# Patient Record
Sex: FEMALE | Employment: FULL TIME | URBAN - METROPOLITAN AREA
[De-identification: names, ages, dates, MRNs, and addresses within clinical notes are randomized per-mention and may not be internally consistent; named-entity substitution may affect disease eponyms.]

---

## 2024-04-08 ENCOUNTER — OFFICE VISIT (OUTPATIENT)
Dept: FAMILY MEDICINE CLINIC | Facility: CLINIC | Age: 29
End: 2024-04-08
Payer: MEDICARE

## 2024-04-08 VITALS
BODY MASS INDEX: 47.65 KG/M2 | DIASTOLIC BLOOD PRESSURE: 88 MMHG | HEIGHT: 65 IN | TEMPERATURE: 97.8 F | WEIGHT: 286 LBS | OXYGEN SATURATION: 98 % | SYSTOLIC BLOOD PRESSURE: 122 MMHG | HEART RATE: 96 BPM | RESPIRATION RATE: 16 BRPM

## 2024-04-08 DIAGNOSIS — F43.23 ADJUSTMENT DISORDER WITH MIXED ANXIETY AND DEPRESSED MOOD: ICD-10-CM

## 2024-04-08 DIAGNOSIS — F32.A ANXIETY AND DEPRESSION: ICD-10-CM

## 2024-04-08 DIAGNOSIS — F41.9 ANXIETY AND DEPRESSION: ICD-10-CM

## 2024-04-08 DIAGNOSIS — R03.0 ELEVATED BP WITHOUT DIAGNOSIS OF HYPERTENSION: ICD-10-CM

## 2024-04-08 DIAGNOSIS — E66.01 OBESITY, MORBID, BMI 40.0-49.9 (HCC): ICD-10-CM

## 2024-04-08 DIAGNOSIS — E55.9 VITAMIN D DEFICIENCY: ICD-10-CM

## 2024-04-08 DIAGNOSIS — Z98.84 HISTORY OF BARIATRIC SURGERY: Primary | ICD-10-CM

## 2024-04-08 PROCEDURE — 99204 OFFICE O/P NEW MOD 45 MIN: CPT | Performed by: FAMILY MEDICINE

## 2024-04-08 RX ORDER — BUPROPION HYDROCHLORIDE 150 MG/1
150 TABLET ORAL EVERY MORNING
Qty: 30 TABLET | Refills: 1 | Status: SHIPPED | OUTPATIENT
Start: 2024-04-08 | End: 2024-10-05

## 2024-04-08 NOTE — ASSESSMENT & PLAN NOTE
Performed in October 2019 and was seeing Dr. Muir in the past   Was previously 307 lbs and lost 110 lbs as a result of surgery.   Pt unfortunately regained 85 lbs with both pregnancies combined  States she's tried diet and consumes low calories  Also worked out for 1 months and saw no results   Recommend seeing weight management and resuming exercise   Also started Wellbutrin which may help with weight   Had tried phentermine and Topamax prior to the surgery

## 2024-04-08 NOTE — ASSESSMENT & PLAN NOTE
Ambulatory Care Coordination ED COVID Follow up Call    Challenges to be reviewed by the provider   Additional needs identified to be addressed with provider no  none           Encounter was not routed to provider for escalation. Method of communication with provider : none    Discussed COVID-19 related testing which was available at this time. Test results were positive. Patient informed of results, if available? yes. Current Symptoms: cough, no new symptoms, no worsening symptoms and son states patient doing a bit better today. Reviewed New or Changed Meds: yes    Do you have what you need at home?  Durable Medical Equipment ordered at discharge: None   Home Health/Outpatient orders at discharge: none    Pulse oximeter? no     Patient education provided: Reviewed appropriate site of care based on symptoms and resources available to patient including: PCP, Urgent Care Clinics and [address] [place] [address]. Interventions: Obtained and reviewed discharge summary and/or continuity of care documents  Reviewed discharge instructions, medical action plan and red flags with family who verbalized understanding. Provided contact information for future needs. Plan for follow-up call in 3-5 days based on severity of symptoms and risk factors.   Plan for next call: follow up     Adrian Cruz RN Bp initially elevated but came down to 122/88   Anxiety and depression also contributing  Will continue to monitor

## 2024-04-08 NOTE — PROGRESS NOTES
Name: Annie Ibrahim      : 1995      MRN: [id number]  Encounter Provider: Anay Laird MD  Encounter Date: 2024   Encounter department: [facility]    Assessment & Plan     1. History of bariatric surgery  Assessment & Plan:  Performed in 2019 and was seeing Dr. Muir in the past   Was previously 307 lbs and lost 110 lbs as a result of surgery.   Pt unfortunately regained 85 lbs with both pregnancies combined  States she's tried diet and consumes low calories  Also worked out for 1 months and saw no results   Recommend seeing weight management and resuming exercise   Also started Wellbutrin which may help with weight   Had tried phentermine and Topamax prior to the surgery     Orders:  -     Ambulatory Referral to Weight Management; Future  -     CBC and differential; Future  -     Comprehensive metabolic panel; Future  -     Lipid panel; Future  -     Hemoglobin A1C; Future  -     Vitamin B12; Future  -     Vitamin D 25 hydroxy; Future  -     Folate; Future  -     Vitamin B1, whole blood; Future    2. Obesity, morbid, BMI 40.0-49.9 ([facility])  -     Ambulatory Referral to Weight Management; Future  -     Comprehensive metabolic panel; Future  -     Lipid panel; Future  -     Hemoglobin A1C; Future  -     TSH, 3rd generation; Future    3. Elevated BP without diagnosis of hypertension  Assessment & Plan:  Bp initially elevated but came down to 122/88   Anxiety and depression also contributing  Will continue to monitor      4. Anxiety and depression  Assessment & Plan:  Diagnosed 2-3 years ago after her second child and was on zoloft briefly   Was previosuly stress related and is going through a break up  Son also recently diagnosed with autism   Mom is supportive and moved in with her recently  Discussed treatment   Will start wellbutrin 150 mg daily   Also referred pt to psych    Orders:  -     buPROPion (WELLBUTRIN XL) 150 mg 24 hr tablet; Take 1 tablet (150 mg total) by  "mouth every morning  -     CBC and differential; Future  -     TSH, 3rd generation; Future  -     Vitamin B12; Future  -     Vitamin D 25 hydroxy; Future  -     Ambulatory referral to Psych Services; Future    5. Vitamin D deficiency  Assessment & Plan:  Will recheck    Orders:  -     Vitamin D 25 hydroxy; Future    6. Adjustment disorder with mixed anxiety and depressed mood  -     Ambulatory referral to Psych Services; Future           Subjective      New patient   Last PCP Dr. Barba with [facility] who is no longer part of the practice   He was last seen 2 years ago   History of of anxiety and depression  Blood pressure worsened after having flu in Feb  Never had issues in the past  The depression has gotten worse in the past 3 weeks   Son recently diagnosed with autsism and broke with the father of her kids  Were together for 10 years and has recently moved in with her mother  AVELINO beltran 3 panic attacks the this past week   Weight also affecting her mood  Interested in weight loss medications  States she tried diet and exercise without any results    Anxiety        Review of Systems    No current outpatient medications on file prior to visit.       Objective     /88 (BP Location: Left arm, Patient Position: Sitting)   Pulse 96   Temp 97.8 °F (36.6 °C) (Tympanic)   Resp 16   Ht 5' 5\" (1.651 m)   Wt 130 kg (286 lb)   SpO2 98%   BMI 47.59 kg/m²     Physical Exam  Constitutional:       General: She is not in acute distress.     Appearance: Normal appearance. She is not ill-appearing or toxic-appearing.   HENT:      Head: Normocephalic and atraumatic.      Right Ear: Tympanic membrane normal.      Left Ear: Tympanic membrane normal.      Mouth/Throat:      Mouth: Mucous membranes are moist.   Eyes:      Extraocular Movements: Extraocular movements intact.   Cardiovascular:      Rate and Rhythm: Normal rate and regular rhythm.      Heart sounds: No murmur heard.  Pulmonary:      Effort: Pulmonary effort is " normal. No respiratory distress.      Breath sounds: Normal breath sounds. No stridor. No wheezing or rales.   Abdominal:      General: There is no distension.      Palpations: Abdomen is soft. There is no mass.      Tenderness: There is no abdominal tenderness. There is no guarding or rebound.      Hernia: No hernia is present.   Musculoskeletal:      Right lower leg: No edema.      Left lower leg: No edema.   Lymphadenopathy:      Cervical: No cervical adenopathy.   Skin:     General: Skin is warm.   Neurological:      Mental Status: She is alert and oriented to person, place, and time.   Psychiatric:         Attention and Perception: Attention normal.         Mood and Affect: Mood is anxious and depressed. Affect is tearful.         Speech: Speech normal.         Behavior: Behavior is cooperative.     Anay Laird MD

## 2024-04-08 NOTE — ASSESSMENT & PLAN NOTE
Diagnosed 2-3 years ago after her second child and was on zoloft briefly   Was previosuly stress related and is going through a break up  Son also recently diagnosed with autism   Mom is supportive and moved in with her recently  Discussed treatment   Will start wellbutrin 150 mg daily   Also referred pt to psych

## 2024-04-09 ENCOUNTER — TELEPHONE (OUTPATIENT)
Dept: ADMINISTRATIVE | Facility: OTHER | Age: 29
End: 2024-04-09

## 2024-04-09 NOTE — TELEPHONE ENCOUNTER
----- Message from Anay Laird MD sent at 4/8/2024  1:37 PM EDT -----  Regarding: pap  Patient states she had a pap through [facility] in Jan 2023. Could we please review the chart. Thank you       Amy BIGGS [facility]  04/08/24 10:23 AM    Hello, our patient above has had Pap Smear (HPV) aka Cervical Cancer Screening completed/performed. Please assist in updating the patient chart by making an External outreach to [facility]  facility located in [place], [place]. The date of service is 01/2023.    Thank you,  Amy HAND

## 2024-04-09 NOTE — LETTER
Procedure Request Form: Cervical Cancer Screening      Date Requested: 24  Patient: Annie Ibrahim  Patient : 1995   Referring Provider: Anay Laird MD        Date of Procedure _________2023 report_____________________       The above patient has informed us that they have completed their   most recent Cervical Cancer Screening at your facility. Please complete   this form and attach all corresponding procedure reports/results.    Comments __________________________________________________________  ____________________________________________________________________  ____________________________________________________________________  ____________________________________________________________________    Facility Completing Procedure _________________________________________    Form Completed By (print name) _______________________________________      Signature __________________________________________________________      These reports are needed for  compliance.    Please fax this completed form and a copy of the procedure report to our office located at [address] [place] [address] as soon as possible to Fax [phone] attention Chas: Phone [phone]    We thank you for your assistance in treating our mutual patient.

## 2024-04-09 NOTE — TELEPHONE ENCOUNTER
Upon review of the In Basket request and the patient's chart, initial outreach has been made via fax to facility. Please see Contacts section for details.     Thank you  Chas Chavez MA

## 2024-04-11 NOTE — TELEPHONE ENCOUNTER
Upon review of the In Basket request we were able to locate, review, and update the patient chart as requested for Pap Smear (HPV) aka Cervical Cancer Screening.    Any additional questions or concerns should be emailed to the Practice Liaisons via the appropriate education email address, please do not reply via In Basket.    Thank you  Chas Chavez MA

## 2024-04-26 DIAGNOSIS — H10.021 PINK EYE DISEASE OF RIGHT EYE: Primary | ICD-10-CM

## 2024-06-17 ENCOUNTER — CONSULT (OUTPATIENT)
Dept: FAMILY MEDICINE CLINIC | Facility: CLINIC | Age: 29
End: 2024-06-17
Payer: MEDICARE

## 2024-06-17 VITALS
BODY MASS INDEX: 48.25 KG/M2 | RESPIRATION RATE: 16 BRPM | SYSTOLIC BLOOD PRESSURE: 126 MMHG | TEMPERATURE: 96 F | HEART RATE: 76 BPM | HEIGHT: 65 IN | DIASTOLIC BLOOD PRESSURE: 90 MMHG | OXYGEN SATURATION: 95 % | WEIGHT: 289.6 LBS

## 2024-06-17 DIAGNOSIS — F32.A ANXIETY AND DEPRESSION: ICD-10-CM

## 2024-06-17 DIAGNOSIS — E66.01 OBESITY, MORBID, BMI 40.0-49.9 (HCC): ICD-10-CM

## 2024-06-17 DIAGNOSIS — Z01.818 PREOPERATIVE CLEARANCE: Primary | ICD-10-CM

## 2024-06-17 DIAGNOSIS — E55.9 VITAMIN D DEFICIENCY: ICD-10-CM

## 2024-06-17 DIAGNOSIS — Z98.84 HISTORY OF BARIATRIC SURGERY: ICD-10-CM

## 2024-06-17 DIAGNOSIS — E53.8 VITAMIN B12 DEFICIENCY: ICD-10-CM

## 2024-06-17 DIAGNOSIS — R03.0 ELEVATED BP WITHOUT DIAGNOSIS OF HYPERTENSION: ICD-10-CM

## 2024-06-17 DIAGNOSIS — F41.9 ANXIETY AND DEPRESSION: ICD-10-CM

## 2024-06-17 DIAGNOSIS — E61.1 IRON DEFICIENCY: ICD-10-CM

## 2024-06-17 PROCEDURE — 99214 OFFICE O/P EST MOD 30 MIN: CPT | Performed by: FAMILY MEDICINE

## 2024-06-17 RX ORDER — BUPROPION HYDROCHLORIDE 300 MG/1
300 TABLET ORAL EVERY MORNING
Qty: 30 TABLET | Refills: 1 | Status: SHIPPED | OUTPATIENT
Start: 2024-06-17 | End: 2024-12-14

## 2024-06-17 NOTE — PROGRESS NOTES
"PRE-OPERATIVE EXAMINATION  Annie Tobias  1995    Annie Tobias is a 29 y.o. female with anxiety, depression, and obesity who is planning to undergo laparoscopic sleeve gastrectomy under general by Dr. Rodriguez on 7/10/24.  Patient has not had complications with anesthesia in the past.     ROS:   Chest pain: no   Shortness of breath: no  Shortness of breath with exertion: no  Orthopnea: no  Dizziness: no  Unexplained weight change: no    PMH:  CAD: no  HTN: no  CKD: no  DM: no on insulin: no  History of CVA: no     reports that she has never smoked. She has never used smokeless tobacco. She reports that she does not currently use alcohol after a past usage of about 2.0 standard drinks of alcohol per week. She reports that she does not use drugs.    /90 (BP Location: Right arm, Patient Position: Sitting, Cuff Size: Large)   Pulse 76   Temp (!) 96 °F (35.6 °C) (Tympanic)   Resp 16   Ht 5' 5\" (1.651 m)   Wt 131 kg (289 lb 9.6 oz)   SpO2 95%   BMI 48.19 kg/m²   Physical Exam  Vitals reviewed.   Constitutional:       Appearance: Normal appearance.   HENT:      Head: Normocephalic and atraumatic.      Mouth/Throat:      Mouth: Mucous membranes are moist.   Eyes:      Extraocular Movements: Extraocular movements intact.   Cardiovascular:      Rate and Rhythm: Normal rate and regular rhythm.      Heart sounds: No murmur heard.  Pulmonary:      Effort: Pulmonary effort is normal. No respiratory distress.      Breath sounds: Normal breath sounds. No wheezing or rales.   Abdominal:      General: There is no distension.      Palpations: Abdomen is soft. There is no mass.      Tenderness: There is no abdominal tenderness. There is no guarding or rebound.      Hernia: No hernia is present.   Musculoskeletal:      Right lower leg: No edema.      Left lower leg: No edema.   Lymphadenopathy:      Cervical: No cervical adenopathy.   Skin:     General: Skin is warm.   Neurological:      Mental Status: She is " alert and oriented to person, place, and time.   Psychiatric:         Behavior: Behavior normal.         Revised Cardiac Risk Index (RCRI) for Pre-Operative Risk   (estimates risk of cardiac complications after noncardiac surgery)    High-risk surgery: No 0   Intraperitoneal, intrathoracic, suprainguinal vascular  History of ischemic heart disease: No 0  History of CHF: No 0   History of cerebrovascular disease: No 0   Pre-operative treatment with insulin: No 0   Pre-operative creatinine >2 mg/dL: No 0    RCRI Scorin points: Class I Risk, 3.9% Risk of Major Cardiac Event     Functional Capacity Levels      Good (7-10 METs):   - Do light housework    - climbs a flight of stairs or walk up a hill   - Walk on ground level at 4 mph   - Do heavy work I.e scrubbing floors, lifting or moving furniture  - Moderate recreational activities I.e golf,bowling,dancing, doubles tennis          Lab Results   Component Value Date    CREATININE 0.58 2024       Annie was seen today for pre-op exam.    Diagnoses and all orders for this visit:    Preoperative clearance  Comments:  Undergoing laproscopic gastrectomy 24    Obesity, morbid, BMI 40.0-49.9 (HCC)    Elevated BP without diagnosis of hypertension    Anxiety and depression  Comments:  Some improvment on welbutrin 150 mg daily. Will increase the dose to 300 mg daily  Orders:  -     buPROPion (WELLBUTRIN XL) 300 mg 24 hr tablet; Take 1 tablet (300 mg total) by mouth every morning    History of bariatric surgery    Vitamin D deficiency  Comments:  Measured 15. Surgeon order supplements to take before surgery    Iron deficiency  Comments:  Recieved iron infusions in the past. Cannot tolerate oral iron pills. Suggest discussing venofer with sergeon    Vitamin B12 deficiency  Comments:  Low based on recent labs ordered by bariatric. Started supplements        Recommendations:  Anine Tobias is undergoing an elective Moderate Risk surgery, laparoscopic gastrectomy.    She is RCRI class I risk  with 3.9% risk for major adverse cardiac event (MACE).         1. Preoperative workup as follows ECG, hemoglobin, hematocrit, electrolytes, creatinine, glucose, liver function studies, coagulation studies.  2. Change in medication regimen before surgery: none, continue medication regimen including morning of surgery, with sip of water.  3. Prophylaxis for cardiac events with perioperative beta-blockers: not indicated.  4. Invasive hemodynamic monitoring perioperatively: at the discretion of anesthesiologist.  5. Deep vein thrombosis prophylaxis postoperatively:regimen to be chosen by surgical team.  6. Surveillance for postoperative MI with ECG immediately postoperatively and on postoperative days 1 and 2 AND troponin levels 24 hours postoperatively and on day 4 or hospital discharge (whichever comes first): not indicated.    She may proceed with surgery as planned without further workup.  Pre-operative form completed and faxed today to office as requested.      Anay Laird M.D.

## 2024-06-17 NOTE — ASSESSMENT & PLAN NOTE
Performed in October 2019 and was seeing Dr. Muir in the past   Was previously 307 lbs and lost 110 lbs as a result of surgery.   Pt unfortunately regained 85 lbs with both pregnancies combined  Tried diet and exercise without significant results  Also started Wellbutrin to help mood and weight   She reestablished with obesity medicine at [facility] and is tentatively scheduled to have bariatric procedure

## 2024-07-22 ENCOUNTER — TELEPHONE (OUTPATIENT)
Age: 29
End: 2024-07-22

## 2024-07-22 NOTE — TELEPHONE ENCOUNTER
Contacted pt. off of Wait List in order to Schedule TT/MM, LVM to contact [phone] option 3 (Intake Team) in order to be scheduled with a Provider.    Promise Verified  [employer].  [phone]